# Patient Record
Sex: FEMALE | HISPANIC OR LATINO | Employment: PART TIME | URBAN - METROPOLITAN AREA
[De-identification: names, ages, dates, MRNs, and addresses within clinical notes are randomized per-mention and may not be internally consistent; named-entity substitution may affect disease eponyms.]

---

## 2018-01-26 ENCOUNTER — AMBULATORY - HEALTHEAST (OUTPATIENT)
Dept: MULTI SPECIALTY CLINIC | Facility: CLINIC | Age: 33
End: 2018-01-26

## 2018-01-26 LAB — PAP SMEAR - HIM PATIENT REPORTED: NORMAL

## 2018-08-07 ASSESSMENT — MIFFLIN-ST. JEOR: SCORE: 1489.67

## 2018-08-08 ENCOUNTER — ANESTHESIA - HEALTHEAST (OUTPATIENT)
Dept: OBGYN | Facility: CLINIC | Age: 33
End: 2018-08-08

## 2018-08-09 ENCOUNTER — SURGERY - HEALTHEAST (OUTPATIENT)
Dept: OBGYN | Facility: CLINIC | Age: 33
End: 2018-08-09

## 2018-08-09 ENCOUNTER — ANESTHESIA - HEALTHEAST (OUTPATIENT)
Dept: OBGYN | Facility: CLINIC | Age: 33
End: 2018-08-09

## 2018-08-14 ENCOUNTER — HOME CARE/HOSPICE - HEALTHEAST (OUTPATIENT)
Dept: HOME HEALTH SERVICES | Facility: HOME HEALTH | Age: 33
End: 2018-08-14

## 2018-08-15 ENCOUNTER — HOME CARE/HOSPICE - HEALTHEAST (OUTPATIENT)
Dept: HOME HEALTH SERVICES | Facility: HOME HEALTH | Age: 33
End: 2018-08-15

## 2019-06-10 ENCOUNTER — COMMUNICATION - HEALTHEAST (OUTPATIENT)
Dept: SCHEDULING | Facility: CLINIC | Age: 34
End: 2019-06-10

## 2019-06-28 ENCOUNTER — OFFICE VISIT - HEALTHEAST (OUTPATIENT)
Dept: FAMILY MEDICINE | Facility: CLINIC | Age: 34
End: 2019-06-28

## 2019-06-28 DIAGNOSIS — H60.391 INFECTIVE OTITIS EXTERNA, RIGHT: ICD-10-CM

## 2019-06-28 DIAGNOSIS — R09.81 NASAL CONGESTION: ICD-10-CM

## 2019-06-28 DIAGNOSIS — J06.9 VIRAL URI WITH COUGH: ICD-10-CM

## 2019-07-30 ENCOUNTER — OFFICE VISIT - HEALTHEAST (OUTPATIENT)
Dept: FAMILY MEDICINE | Facility: CLINIC | Age: 34
End: 2019-07-30

## 2019-07-30 DIAGNOSIS — M54.50 ACUTE RIGHT-SIDED LOW BACK PAIN WITHOUT SCIATICA: ICD-10-CM

## 2019-07-30 DIAGNOSIS — R00.0 SINUS TACHYCARDIA: ICD-10-CM

## 2019-07-30 DIAGNOSIS — F45.8 TEETH GRINDING: ICD-10-CM

## 2019-07-30 DIAGNOSIS — I10 BENIGN ESSENTIAL HYPERTENSION: ICD-10-CM

## 2019-07-30 DIAGNOSIS — Z76.89 ENCOUNTER TO ESTABLISH CARE: ICD-10-CM

## 2021-05-29 NOTE — TELEPHONE ENCOUNTER
Triage call : No PCP within HE- unable to route    Patient and friend Chaparrita on the line- offered  for patient/friend and she declined. Friend was interpreting for patient who is also on the line.      Having nipple pain- redness, pimple like dots and pain around areola that started 3 days ago. Patient is still pumping and getting 1-2 oz each time. Child is 10 months. No fever.     Triaged to be seen within the next 24 hours- advised her to call her PCP to be seen tomorrow.     Blessing Sheffield RN [facility] Care Connection Triage/Med Refill 6/10/2019 6:15 PM    Reason for Disposition    [1] Breast looks infected (e.g, spreading redness) AND [2] no fever    Protocols used: POSTPARTUM - BREASTFEEDING [id number]
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
## 2021-05-30 NOTE — PROGRESS NOTES
WALK IN CARE - VISIT NOTE    ASSESSMENT/PLAN  1. Infective otitis externa, right  Pain and purulence in right ear canal suggestive of infective process.  In her ear/TM do not look infected, so I do not think an oral antibiotic is warranted at this time.  - ofloxacin (FLOXIN) 0.3 % otic solution; 10 drops to right ear once daily for 7 days  Dispense: 10 mL; Refill: 0    2. Viral URI with cough  3. Nasal congestion  Likely a viral process, could be some allergic component as well.  Symptomatic care as discussed and handout provided.  Treatment as below.  - fluticasone (VERAMYST) 27.5 mcg/actuation nasal spray; 1 puff each nostril 2x per day  Dispense: 10 g; Refill: 12  - cetirizine (ZYRTEC) 10 MG tablet; Take 1 tablet (10 mg total) by mouth daily.  Dispense: 30 tablet; Refill: 2    Follow up in 1-2 weeks if symptoms persist.     Options for treatment and follow-up care were reviewed with the patient and/or guardian. Discussed symptoms in which to return to clinic sooner or go to the ER for evaluation. Milana Teague and/or guardian engaged in the decision making process and verbalized understanding of the options discussed and agreed with the final plan.    Jama Warren MD     \[facility]\""    Milana Teague is a 33 y.o. female brought in by her friend who is acting as the  presenting for evaluation of multiple symptoms:    Symptoms started yesterday - thinks first was her ear then other symptoms  Having a cough which makes her headache worse  Felt like she was warm yesterday  Having some pain in her right ear  Has some generalized muscle aches throughout  Having a lot of sneezing  Does not have Hx of allergies  No changes to her hearing  She did have one tablet of an antibiotic left over, maybe amoxicillin that she took one dose of. This was years old that she had left over  Did try to take an allergy pill but she does not know which      ROS  Complete ROS negative except as noted in HPI.    Social History      Tobacco Use     Smoking status: Never Smoker     Smokeless tobacco: Never Used   Substance Use Topics     Alcohol use: No     Drug use: No       Past Medical History:   Diagnosis Date     Anemia      Hepatitis A      Past Surgical History:   Procedure Laterality Date      SECTION N/A 2018    Procedure:  SECTION;  Surgeon: Demond Smith MD;  Location: [facility];  Service:       SECTION, LOW TRANSVERSE  2018    [facility]          OBJECTIVE  Vitals:    19 1813   BP: 121/78   Pulse: (!) 127   Resp: 18   Temp: 99  F (37.2  C)   SpO2: 99%       Physical Exam  General: No acute distress, sitting up in chair  Eyes: EOMI, PERRLA, normal conjunctiva, normal sclera   Ears: Right ear canal has purulent drainage and very tender with otoscope in canal, TM is not bulging or erythematous, left ear normal  Nose: moist mucosa, no rhinorrhea  Oral: moist oral mucosa, no tonsillar exudates, no erythema  Lymph: no lymphadenopathy  Neck: good ROM, supple  CV: Tachy but regular, distal and peripheral pulses in tact  Resp: CTA bilaterally, no wheezing, no rhonchi, no rhales, no respiratory distress  Abdomen: soft, non-tender, normal bowel sounds  Neuro: moves all 4 extremities, no focal deficits noted  Extrem: no edema, no cyanosis, well-perfused

## 2021-06-01 VITALS — HEIGHT: 65 IN | WEIGHT: 178 LBS | BODY MASS INDEX: 29.66 KG/M2

## 2021-06-03 VITALS — WEIGHT: 168 LBS | BODY MASS INDEX: 27.96 KG/M2

## 2021-06-03 VITALS — BODY MASS INDEX: 24.17 KG/M2 | WEIGHT: 145.25 LBS

## 2021-06-16 PROBLEM — O14.13 SEVERE PREECLAMPSIA, THIRD TRIMESTER: Status: ACTIVE | Noted: 2018-08-07

## 2021-06-16 PROBLEM — Z98.891 S/P CESAREAN SECTION: Status: ACTIVE | Noted: 2018-08-14

## 2021-06-17 NOTE — PATIENT INSTRUCTIONS - HE
Patient Instructions by Jama Warren MD at 6/28/2019  5:50 PM     Author: Jama Warren MD Service: -- Author Type: Resident    Filed: 6/28/2019  6:36 PM Encounter Date: 6/28/2019 Status: Signed    : Jama Warren MD (Resident)       Patient Education     External Ear Infection (Adult)    External otitis (also called swimmers ear) is an infection in the ear canal. It is often caused by bacteria or fungus. It can occur a few days after water gets trapped in the ear canal (from swimming or bathing). It can also occur after cleaning too deeply in the ear canal with a cotton swab or other object. Sometimes, hair care products get into the ear canal and cause this problem.  Symptoms can include pain, fever, itching, redness, drainage, or swelling of the ear canal. Temporary hearing loss may also occur.  Home care    Do not try to clean the ear canal. This can push pus and bacteria deeper into the canal.    Use prescribed ear drops as directed. These help reduce swelling and fight the infection. If an ear wick was placed in the ear canal, apply drops right onto the end of the wick. The wick will draw the medicine into the ear canal even if it is swollen closed.    A cotton ball may be loosely placed in the outer ear to absorb any drainage.    You may use acetaminophen or ibuprofen to control pain, unless another medicine was prescribed. Note: If you have chronic liver or kidney disease or ever had a stomach ulcer or GI bleeding, talk to your healthcare provider before taking any of these medicines.    Do not allow water to get into your ear when bathing. Also, don't swim until the infection has cleared.  Prevention    Keep your ears dry. This helps lower the risk of infection. Dry your ears with a towel or hair dryer after getting wet. Also, use ear plugs when swimming.    Do not stick any objects in the ear to remove wax.    If you feel water trapped in your ear, use ear drops right away. You can get  "these drops over the counter at most drugstores. They work by removing water from the ear canal.  Follow-up care  Follow up with your healthcare provider in 1 week, or as advised.  When to seek medical advice  Call your healthcare provider right away if any of these occur:    Ear pain becomes worse or doesnt improve after 3 days of treatment    Redness or swelling of the outer ear occurs or gets worse    Headache    Painful or stiff neck    Drowsiness or confusion    Fever of 100.4 F (38 C) or higher, or as directed by your healthcare provider    Seizure  Date Last Reviewed: 10/1/2017    3689-9716 [employer]. [address] [place], [place] [id number]. All rights reserved. This information is not intended as a substitute for professional medical care. Always follow your healthcare professional's instructions.         Patient Education     Viral Syndrome (Adult)  A viral illness may cause a number of symptoms. The symptoms depend on the part of the body that the virus affects. If it settles in your nose, throat, and lungs, it may cause cough, sore throat, congestion, and sometimes headache. If it settles in your stomach and intestinal tract, it may cause vomiting and diarrhea. Sometimes it causes vague symptoms like \"aching all over,\" feeling tired, loss of appetite, or fever.  A viral illness usually lasts 1 to 2 weeks, but sometimes it lasts longer. In some cases, a more serious infection can look like a viral syndrome in the first few days of the illness. You may need another exam and additional tests to know the difference. Watch for the warning signs listed below.  Home care  Follow these guidelines for taking care of yourself at home:    If symptoms are severe, rest at home for the first 2 to 3 days.    Stay away from cigarette smoke - both your smoke and the smoke from others.    You may use over-the-counter acetaminophen or ibuprofen for fever, muscle aching, and headache, unless another " medicine was prescribed for this. If you have chronic liver or kidney disease or ever had a stomach ulcer or GI bleeding, talk with your doctor before using these medicines. No one who is younger than 18 and ill with a fever should take aspirin. It may cause severe disease or death.    Your appetite may be poor, so a light diet is fine. Avoid dehydration by drinking 8 to 12 8-ounce glasses of fluids each day. This may include water; orange juice; lemonade; apple, grape, and cranberry juice; clear fruit drinks; electrolyte replacement and sports drinks; and decaffeinated teas and coffee. If you have been diagnosed with a kidney disease, ask your doctor how much and what types of fluids you should drink to prevent dehydration. If you have kidney disease, drinking too much fluid can cause it build up in the your body and be dangerous to your health.    Over-the-counter remedies won't shorten the length of the illness but may be helpful for cough, sore throat; and nasal and sinus congestion. Don't use decongestants if you have high blood pressure.  Follow-up care  Follow up with your healthcare provider if you do not improve over the next week.  Call 911  Call 911 if any of the following occur:    Convulsion    Feeling weak, dizzy, or like you are going to faint    Chest pain, shortness of breath, wheezing, or difficulty breathing  When to seek medical advice  Call your healthcare provider right away if any of these occur:    Cough with lots of colored sputum (mucus) or blood in your sputum    Chest pain, shortness of breath, wheezing, or difficulty breathing    Severe headache; face, neck, or ear pain    Severe, constant pain in the lower right side of your belly (abdominal)    Continued vomiting (cant keep liquids down)    Frequent diarrhea (more than 5 times a day); blood (red or black color) or mucus in diarrhea    Feeling weak, dizzy, or like you are going to faint    Extreme thirst    Fever of 100.4 F (38 C) or  higher, or as directed by your healthcare provider  Date Last Reviewed: 9/25/2015 2000-2017 [email or web address]. [address], [place] [address]. All rights reserved. This information is not intended as a substitute for professional medical care. Always follow your healthcare professional's instructions.

## 2021-06-19 NOTE — ANESTHESIA POSTPROCEDURE EVALUATION
Patient: Milana Teague   SECTION  Anesthesia type: epidural    Patient location: [facility] Surgical Floor  Last vitals:   Vitals:    18 1818   BP: 124/62   Pulse: 85   Resp: 20   Temp:    SpO2:      Post vital signs: stable  Level of consciousness: awake and responds to simple questions  Post-anesthesia pain: pain controlled  Post-anesthesia nausea and vomiting: no  Pulmonary: unassisted, return to baseline  Cardiovascular: stable and blood pressure at baseline  Hydration: adequate  Anesthetic events: no    QCDR Measures:  ASA# 11 - Bibiana-op Cardiac Arrest: ASA11B - Patient did NOT experience unanticipated cardiac arrest  ASA# 12 - Bibiana-op Mortality Rate: ASA12B - Patient did NOT die  ASA# 13 - PACU Re-Intubation Rate: NA - No ETT / LMA used for case  ASA# 10 - Composite Anes Safety: ASA10A - No serious adverse event    Additional Notes:

## 2021-06-19 NOTE — ANESTHESIA PROCEDURE NOTES
Epidural Block    Patient location during procedure: OB  Time Called: 8/8/2018 6:07 PM  Reason for Block:labor epidural  Staffing:  Performing  Anesthesiologist: DOUG ESPINOZA  Preanesthetic Checklist  Completed: patient identified, risks, benefits, and alternatives discussed, timeout performed, consent obtained, at patient's request, airway assessed, oxygen available, suction available, emergency drugs available and hand hygiene performed  Procedure  Patient position: sitting  Prep: ChloraPrep and site prepped and draped  Patient monitoring: continuous pulse oximetry, heart rate and blood pressure  Approach: midline  Location: L2-L3  Injection technique: VIRIDIANA saline  Number of Attempts:1  Needle  Needle type: Honk   Needle gauge: 17 G     Catheter in Space: 5  Assessment  Sensory level: T10  No complications      Additional Notes:  3cc 1% lidocaine skin, negative aspiration, negative test dose of 4cc 1.5% PF lidocaine with epi 1/200

## 2021-06-19 NOTE — ANESTHESIA PROCEDURE NOTES
Epidural Block    Patient location during procedure: OR    Reason for Block:primary anesthetic    Additional Notes:  Epidural already in place.  See note from epidural placement.  bolused for

## 2021-06-19 NOTE — ANESTHESIA PREPROCEDURE EVALUATION
Anesthesia Evaluation      No history of anesthetic complications     Airway   Mallampati: III  Neck ROM: full   Pulmonary - negative ROS    breath sounds clear to auscultation                         Cardiovascular - normal exam  (+) hypertension, ,     Rhythm: regular  Rate: normal,      ROS comment: Pre-eclampsia: on magnesium therapy     Neuro/Psych - negative ROS     Endo/Other    (+) pregnant     GI/Hepatic/Renal - negative ROS           Dental - normal exam                        Anesthesia Plan  Planned anesthetic: epidural  Epidural in place, working well  ASA 3 - emergent     Anesthetic plan and risks discussed with: patient (neighbor used as )    Post-op plan: routine recovery

## 2021-06-19 NOTE — ANESTHESIA CARE TRANSFER NOTE
Last vitals:   Vitals:    08/09/18 1654   BP: 107/50   Pulse: 81   Resp: 16   Temp: 37  C (98.6  F)   SpO2: 97%     Patient's level of consciousness is awake  Spontaneous respirations: yes  Maintains airway independently: yes  Dentition unchanged: yes  Oropharynx: oropharynx clear of all foreign objects    QCDR Measures:  ASA# 20 - Surgical Safety Checklist: WHO surgical safety checklist completed prior to induction  PQRS# 430 - Adult PONV Prevention: 4558F - Pt received => 2 anti-emetic agents (different classes) preop & intraop  ASA# 8 - Peds PONV Prevention: NA - Not pediatric patient, not GA or 2 or more risk factors NOT present  PQRS# 424 - Bibiana-op Temp Management: 4559F - At least one body temp DOCUMENTED => 35.5C or 95.9F within required timeframe  PQRS# 426 - PACU Transfer Protocol: - Transfer of care checklist used  ASA# 14 - Acute Post-op Pain: ASA14B - Patient did NOT experience pain >= 7 out of 10

## 2021-06-19 NOTE — ANESTHESIA PREPROCEDURE EVALUATION
Anesthesia Evaluation      Patient summary reviewed   No history of anesthetic complications     Airway   Mallampati: II  Neck ROM: full   Pulmonary - negative ROS    breath sounds clear to auscultation                         Cardiovascular   Exercise tolerance: > or = 4 METS  (+) hypertension, ,     (-) angina  Rhythm: regular  Rate: normal,      ROS comment: Severe pre-eclampsia, on magnesium gtt, platelets [id number], INR/ptt normal     Neuro/Psych    (+) anxiety/panic attacks,   (-) no seizures, no neuromuscular disease, no CVA    Endo/Other    (+) pregnant  (-) no diabetes     GI/Hepatic/Renal       Other findings: Pitting edema low back, scoliosis lumbar spine       Dental - normal exam                        Anesthesia Plan  Planned anesthetic: epidural    ASA 3     Anesthetic plan and risks discussed with: patient and  services used  Anesthesia plan special considerations: rapid sequence induction,   Post-op plan: routine recovery

## 2021-06-26 ENCOUNTER — HEALTH MAINTENANCE LETTER (OUTPATIENT)
Age: 36
End: 2021-06-26

## 2021-07-03 NOTE — ADDENDUM NOTE
Addendum Note by Hannah Gutierres MD at 8/16/2018 10:55 AM     Author: Hannah Gutierres MD Service: -- Author Type: Physician    Filed: 8/16/2018 10:55 AM Date of Service: 8/16/2018 10:55 AM Status: Signed    : Hannah Gutierres MD (Physician)       Addendum  created 08/16/18 1055 by Hannah Gutierres MD    Anesthesia Event deleted, Anesthesia Event edited, Anesthesia Intra Blocks edited, Child order released for a procedure order, LDA created via procedure documentation, Procedure Event Log accessed, Sign clinical note

## 2021-07-14 PROBLEM — Z34.90 PREGNANT: Status: RESOLVED | Noted: 2018-08-07 | Resolved: 2018-08-14

## 2021-07-14 PROBLEM — Z34.90 PREGNANCY: Status: RESOLVED | Noted: 2018-08-09 | Resolved: 2018-08-14

## 2021-10-16 ENCOUNTER — HEALTH MAINTENANCE LETTER (OUTPATIENT)
Age: 36
End: 2021-10-16

## 2022-07-23 ENCOUNTER — HEALTH MAINTENANCE LETTER (OUTPATIENT)
Age: 37
End: 2022-07-23

## 2022-10-01 ENCOUNTER — HEALTH MAINTENANCE LETTER (OUTPATIENT)
Age: 37
End: 2022-10-01

## 2022-10-11 ENCOUNTER — LAB REQUISITION (OUTPATIENT)
Dept: LAB | Facility: CLINIC | Age: 37
End: 2022-10-11

## 2022-10-11 DIAGNOSIS — Z12.4 ENCOUNTER FOR SCREENING FOR MALIGNANT NEOPLASM OF CERVIX: ICD-10-CM

## 2022-10-11 PROCEDURE — G0145 SCR C/V CYTO,THINLAYER,RESCR: HCPCS | Performed by: OBSTETRICS & GYNECOLOGY

## 2022-10-11 PROCEDURE — 87624 HPV HI-RISK TYP POOLED RSLT: CPT | Performed by: OBSTETRICS & GYNECOLOGY

## 2022-10-13 LAB
BKR LAB AP GYN ADEQUACY: NORMAL
BKR LAB AP GYN INTERPRETATION: NORMAL
BKR LAB AP HPV REFLEX: NORMAL
BKR LAB AP LMP: NORMAL
BKR LAB AP PREVIOUS ABNL DX: NORMAL
BKR LAB AP PREVIOUS ABNORMAL: NORMAL
PATH REPORT.COMMENTS IMP SPEC: NORMAL
PATH REPORT.COMMENTS IMP SPEC: NORMAL
PATH REPORT.RELEVANT HX SPEC: NORMAL

## 2022-10-17 LAB
HUMAN PAPILLOMA VIRUS 16 DNA: NEGATIVE
HUMAN PAPILLOMA VIRUS 18 DNA: NEGATIVE
HUMAN PAPILLOMA VIRUS FINAL DIAGNOSIS: NORMAL
HUMAN PAPILLOMA VIRUS OTHER HR: NEGATIVE

## 2023-08-06 ENCOUNTER — HEALTH MAINTENANCE LETTER (OUTPATIENT)
Age: 38
End: 2023-08-06

## 2023-12-24 ENCOUNTER — HEALTH MAINTENANCE LETTER (OUTPATIENT)
Age: 38
End: 2023-12-24

## 2024-06-14 ENCOUNTER — OFFICE VISIT (OUTPATIENT)
Dept: FAMILY MEDICINE | Facility: CLINIC | Age: 39
End: 2024-06-14
Payer: COMMERCIAL

## 2024-06-14 ENCOUNTER — TELEPHONE (OUTPATIENT)
Dept: FAMILY MEDICINE | Facility: CLINIC | Age: 39
End: 2024-06-14

## 2024-06-14 VITALS
RESPIRATION RATE: 16 BRPM | BODY MASS INDEX: 25.76 KG/M2 | OXYGEN SATURATION: 100 % | HEART RATE: 97 BPM | SYSTOLIC BLOOD PRESSURE: 130 MMHG | HEIGHT: 63 IN | WEIGHT: 145.4 LBS | TEMPERATURE: 98.3 F | DIASTOLIC BLOOD PRESSURE: 72 MMHG

## 2024-06-14 DIAGNOSIS — E83.41 HYPERMAGNESEMIA: ICD-10-CM

## 2024-06-14 DIAGNOSIS — J30.89 SEASONAL ALLERGIC RHINITIS DUE TO OTHER ALLERGIC TRIGGER: ICD-10-CM

## 2024-06-14 DIAGNOSIS — D17.30 LIPOMA OF SKIN AND SUBCUTANEOUS TISSUE: ICD-10-CM

## 2024-06-14 DIAGNOSIS — F45.8 BRUXISM: ICD-10-CM

## 2024-06-14 DIAGNOSIS — H11.002 PTERYGIUM EYE, LEFT: ICD-10-CM

## 2024-06-14 DIAGNOSIS — N93.9 ABNORMAL UTERINE BLEEDING: ICD-10-CM

## 2024-06-14 DIAGNOSIS — Z01.818 PREOP GENERAL PHYSICAL EXAM: Primary | ICD-10-CM

## 2024-06-14 PROBLEM — O14.90 PRE-ECLAMPSIA: Status: ACTIVE | Noted: 2018-08-07

## 2024-06-14 LAB
ANION GAP SERPL CALCULATED.3IONS-SCNC: 11 MMOL/L (ref 7–15)
BASOPHILS # BLD AUTO: 0 10E3/UL (ref 0–0.2)
BASOPHILS NFR BLD AUTO: 0 %
BUN SERPL-MCNC: 7.7 MG/DL (ref 6–20)
CALCIUM SERPL-MCNC: 9.5 MG/DL (ref 8.6–10)
CHLORIDE SERPL-SCNC: 104 MMOL/L (ref 98–107)
CREAT SERPL-MCNC: 0.7 MG/DL (ref 0.51–0.95)
DEPRECATED HCO3 PLAS-SCNC: 24 MMOL/L (ref 22–29)
EGFRCR SERPLBLD CKD-EPI 2021: >90 ML/MIN/1.73M2
EOSINOPHIL # BLD AUTO: 0 10E3/UL (ref 0–0.7)
EOSINOPHIL NFR BLD AUTO: 1 %
ERYTHROCYTE [DISTWIDTH] IN BLOOD BY AUTOMATED COUNT: 19.2 % (ref 10–15)
GLUCOSE SERPL-MCNC: 122 MG/DL (ref 70–99)
HCT VFR BLD AUTO: 44.2 % (ref 35–47)
HGB BLD-MCNC: 14.2 G/DL (ref 11.7–15.7)
IMM GRANULOCYTES # BLD: 0 10E3/UL
IMM GRANULOCYTES NFR BLD: 0 %
LYMPHOCYTES # BLD AUTO: 1.4 10E3/UL (ref 0.8–5.3)
LYMPHOCYTES NFR BLD AUTO: 21 %
MAGNESIUM SERPL-MCNC: 2.1 MG/DL (ref 1.7–2.3)
MCH RBC QN AUTO: 25.9 PG (ref 26.5–33)
MCHC RBC AUTO-ENTMCNC: 32.1 G/DL (ref 31.5–36.5)
MCV RBC AUTO: 81 FL (ref 78–100)
MONOCYTES # BLD AUTO: 0.5 10E3/UL (ref 0–1.3)
MONOCYTES NFR BLD AUTO: 8 %
NEUTROPHILS # BLD AUTO: 4.8 10E3/UL (ref 1.6–8.3)
NEUTROPHILS NFR BLD AUTO: 70 %
NRBC # BLD AUTO: 0 10E3/UL
NRBC BLD AUTO-RTO: 0 /100
PLATELET # BLD AUTO: 196 10E3/UL (ref 150–450)
POTASSIUM SERPL-SCNC: 4 MMOL/L (ref 3.4–5.3)
RBC # BLD AUTO: 5.49 10E6/UL (ref 3.8–5.2)
SODIUM SERPL-SCNC: 139 MMOL/L (ref 135–145)
WBC # BLD AUTO: 6.9 10E3/UL (ref 4–11)

## 2024-06-14 PROCEDURE — T1013 SIGN LANG/ORAL INTERPRETER: HCPCS | Mod: U4

## 2024-06-14 PROCEDURE — 85025 COMPLETE CBC W/AUTO DIFF WBC: CPT | Performed by: STUDENT IN AN ORGANIZED HEALTH CARE EDUCATION/TRAINING PROGRAM

## 2024-06-14 PROCEDURE — 99203 OFFICE O/P NEW LOW 30 MIN: CPT | Performed by: STUDENT IN AN ORGANIZED HEALTH CARE EDUCATION/TRAINING PROGRAM

## 2024-06-14 PROCEDURE — 80048 BASIC METABOLIC PNL TOTAL CA: CPT | Performed by: STUDENT IN AN ORGANIZED HEALTH CARE EDUCATION/TRAINING PROGRAM

## 2024-06-14 PROCEDURE — 83735 ASSAY OF MAGNESIUM: CPT | Performed by: STUDENT IN AN ORGANIZED HEALTH CARE EDUCATION/TRAINING PROGRAM

## 2024-06-14 PROCEDURE — 36415 COLL VENOUS BLD VENIPUNCTURE: CPT | Performed by: STUDENT IN AN ORGANIZED HEALTH CARE EDUCATION/TRAINING PROGRAM

## 2024-06-14 RX ORDER — FLUTICASONE PROPIONATE 50 MCG
1 SPRAY, SUSPENSION (ML) NASAL DAILY
Qty: 9.9 ML | Refills: 0 | Status: SHIPPED | OUTPATIENT
Start: 2024-06-14 | End: 2024-07-11

## 2024-06-14 RX ORDER — MULTIVITAMIN WITH IRON
1 TABLET ORAL DAILY
COMMUNITY

## 2024-06-14 RX ORDER — FERROUS SULFATE 325(65) MG
325 TABLET ORAL
COMMUNITY

## 2024-06-14 RX ORDER — CETIRIZINE HYDROCHLORIDE 5 MG/1
10 TABLET ORAL DAILY
COMMUNITY

## 2024-06-14 NOTE — PROGRESS NOTES
Preoperative Evaluation  [facility]  [address] [address] [address]  Phone: [phone]  Fax: [phone]  Primary Provider: Demond Smith MD, MD  Pre-op Performing Provider: Earl Gold MD  Jun 14, 2024 6/14/2024   Surgical Information   What procedure is being done? remove a fat ball on my right leg   Facility or Hospital where procedure/surgery will be performed: [facility]   Who is doing the procedure / surgery? DR Bonds   Date of surgery / procedure: june 20   Time of surgery / procedure: 8am   Where do you plan to recover after surgery? at home with family     Fax number for surgical facility: [phone]    Assessment & Plan     The proposed surgical procedure is considered LOW risk.    Preop general physical exam  Ms. Teague is a 38-year-old female who presents today for preoperative physical exam, no acute concerns today other than nasal congestion.  On examination, does have mass consistent with lipoma as described by patient on right medial upper thigh, as well as swollen and erythematous turbinates, discussed further below, otherwise no significant findings.    Lipoma of skin and subcutaneous tissue  Has 3 x 5 cm ovular soft mass in right upper thigh on medial side, consistent with lipoma on exam.  She is having this removed surgically, for which she is being seen by me for preop evaluation.    Seasonal allergic rhinitis due to other allergic trigger    Has allergic rhinitis, usually uses Zyrtec and Flonase.  Likely that the sinus congestion she is experiencing is secondary to allergies, no other symptoms other than sinus congestion and clear rhinorrhea.  Will attempt treatment with Flonase, she may continue Zyrtec at home.    - fluticasone (FLONASE) 50 MCG/ACT nasal spray  Dispense: 9.9 mL; Refill: 0    Abnormal uterine bleeding  Reports that she has significant bleeding with her periods which leads to clotting, and  she has an appointment with gynecology to further evaluate this, she prefers to wait for that visit for the evaluation.  However today we will at least get a blood count to evaluate for anemia prior to her procedure.  She is taking iron supplement daily.    - CBC with platelets and differential  - CBC with platelets and differential    Hypermagnesemia  Had hypermagnesemia in 2018, at the same time that she had preeclampsia, this is most probably iatrogenic however recommend repeat magnesium level as well as BMP today as we do not have any lab work following 2018 for this.  - Magnesium  - Basic metabolic panel  - Magnesium  - Basic metabolic panel    Pterygium eye, left  Has pterygium of the left eye, just starting to encroach on the iris, recommended that she avoid sun exposure to the eye with sunglasses and hats.  She reports she will do this, it is currently asymptomatic.    Bruxism    Possible Sleep Apnea:        6/14/2024     9:53 AM   STOP-Bang Total Score   Total Score 0   Risk Stratification 0 - 2: Low Risk for SABA           - No identified additional risk factors other than previously addressed    Antiplatelet or Anticoagulation Medication Instructions   - Patient is on no antiplatelet or anticoagulation medications.    Additional Medication Instructions   - ibuprofen (Advil, Motrin): DO NOT TAKE 1 day before surgery.     Recommendation  Approval given to proceed with proposed procedure, without further diagnostic evaluation.        Dimas Cortés is a 38 year old, presenting for the following:  Pre-Op Exam          6/14/2024     8:43 AM   Additional Questions   Roomed by LESLIE MERCHANT related to upcoming procedure:         6/14/2024   Pre-Op Questionnaire   Have you ever had a heart attack or stroke? No   Have you ever had surgery on your heart or blood vessels, such as a stent placement, a coronary artery bypass, or surgery on an artery in your head, neck, heart, or legs? No   Do you have chest pain  with activity? No   Do you have a history of heart failure? No   Do you currently have a cold, bronchitis or symptoms of other infection? (!) YES has congestion that started two days. Her spouse also had similar symptoms, but he suffers from allergies. She also suffers from allergies.    Do you have a cough, shortness of breath, or wheezing? No   Do you or anyone in your family have previous history of blood clots? No   Do you or does anyone in your family have a serious bleeding problem such as prolonged bleeding following surgeries or cuts? No   Have you ever had problems with anemia or been told to take iron pills? No   Have you had any abnormal blood loss such as black, tarry or bloody stools, or abnormal vaginal bleeding? No   Have you ever had a blood transfusion? No   Are you willing to have a blood transfusion if it is medically needed before, during, or after your surgery? Yes   Have you or any of your relatives ever had problems with anesthesia? No   Do you have sleep apnea, excessive snoring or daytime drowsiness? (!) UNKNOWN, she has bruxism at night.    Do you have any artifical heart valves or other implanted medical devices like a pacemaker, defibrillator, or continuous glucose monitor? No   Do you have artificial joints? No   Are you allergic to latex? No     Health Care Directive  Patient does not have a Health Care Directive or Living Will: Discussed advance care planning with patient; information given to patient to review.    Preoperative Review of    reviewed - no record of controlled substances prescribed.          Patient Active Problem List    Diagnosis Date Noted    Bruxism 2024     Priority: Medium    Abnormal uterine bleeding      Priority: Medium    Pterygium eye, left      Priority: Medium    Seasonal allergic rhinitis      Priority: Medium    S/P  section 2018     Priority: Medium    Pre-eclampsia 2018     Priority: Medium      No past medical history on  "file.  Past Surgical History:   Procedure Laterality Date    C/SECTION, LOW TRANSVERSE  2018    [facility] / [facility]      SECTION N/A 2018    Procedure:  SECTION;  Surgeon: Demond Smith MD;  Location: [facility] L[facility] OR;  Service:      Current Outpatient Medications   Medication Sig Dispense Refill    acetaminophen (TYLENOL) 100 mg/mL suspension [ACETAMINOPHEN (TYLENOL) 100 MG/ML SUSPENSION] Take 5-10 mL (500-1,000 mg total) by mouth every 4 (four) hours as needed for fever. 300 mL 0    ferrous sulfate (FEROSUL) 325 (65 Fe) MG tablet Take 325 mg by mouth daily (with breakfast)      ibuprofen (ADVIL,MOTRIN) 600 mg/30 mL suspension [IBUPROFEN (ADVIL,MOTRIN) 600 MG/30 ML SUSPENSION] Take 30 mL (600 mg total) by mouth every 6 (six) hours as needed for mild pain (1-3). 900 mL 0    fluticasone (VERAMYST) 27.5 mcg/actuation nasal spray [FLUTICASONE (VERAMYST) 27.5 MCG/ACTUATION NASAL SPRAY] 1 puff each nostril 2x per day (Patient not taking: Reported on 2024) 10 g 12       No Known Allergies     Social History     Tobacco Use    Smoking status: Never     Passive exposure: Never    Smokeless tobacco: Never   Substance Use Topics    Alcohol use: No       History   Drug Use No             Review of Systems  Constitutional, neuro, ENT, endocrine, pulmonary, cardiac, gastrointestinal, genitourinary, musculoskeletal, integument and psychiatric systems are negative, except as otherwise noted.    Objective    /72 (BP Location: Right arm, Patient Position: Sitting, Cuff Size: Adult Regular)   Pulse 97   Temp 98.3  F (36.8  C) (Oral)   Resp 16   Ht 1.608 m (5' 3.31\")   Wt 66 kg (145 lb 6.4 oz)   LMP 2024 (Exact Date)   SpO2 100%   BMI 25.51 kg/m     Estimated body mass index is 25.51 kg/m  as calculated from the following:    Height as of this encounter: 1.608 m (5' 3.31\").    Weight as of this encounter: 66 kg (145 lb 6.4 oz).  Physical Exam  GENERAL: " "alert and no distress  EYES: pterygium of left eye, medial, just encroaching on iris. Eyes otherwise grossly normal to inspection, PERRL and conjunctivae and sclerae otherwise normal  HENT: ear canals and TM's normal, turbinates edematous and erythematous, nose and mouth without ulcers or lesions  NECK: no adenopathy, no asymmetry, masses, or scars  RESP: lungs clear to auscultation - no rales, rhonchi or wheezes  CV: regular rate and rhythm, normal S1 S2, no S3 or S4, no murmur, click or rub, no peripheral edema  ABDOMEN: soft, nontender, no hepatosplenomegaly, no masses and bowel sounds normal  MS: no gross musculoskeletal defects noted, no edema  SKIN: Right upper medial thigh has soft and ovular mass that is approximately 5 x 3 cm in size no other suspicious lesions or rashes.   NEURO: Normal strength and tone, mentation intact and speech normal  PSYCH: mentation appears normal, affect normal/bright    No results for input(s): \"HGB\", \"PLT\", \"INR\", \"NA\", \"POTASSIUM\", \"CR\", \"A1C\" in the last 8760 hours.     Diagnostics  No results found for this or any previous visit (from the past 720 hour(s)).   No EKG required, no history of coronary heart disease, significant arrhythmia, peripheral arterial disease or other structural heart disease.    Revised Cardiac Risk Index (RCRI)  The patient has the following serious cardiovascular risks for perioperative complications:   - No serious cardiac risks = 0 points     RCRI Interpretation: 0 points: Class I (very low risk - 0.4% complication rate)         Signed Electronically by: Earl Gold MD  Copy of this evaluation report is provided to requesting physician.         "

## 2024-06-14 NOTE — TELEPHONE ENCOUNTER
FYI - Status Update    Who is Calling: patient    Update: pt calling with Fax number for [facility] w/Dr. Guzman  Fax #: [phone]    Please fax notes and lab results(when done processing)    Does caller want a call/response back: No   Doxycycline Pregnancy And Lactation Text: This medication is Pregnancy Category D and not consider safe during pregnancy. It is also excreted in breast milk but is considered safe for shorter treatment courses.

## 2024-06-14 NOTE — PATIENT INSTRUCTIONS

## 2024-06-27 ENCOUNTER — HOSPITAL ENCOUNTER (OUTPATIENT)
Facility: CLINIC | Age: 39
Discharge: HOME | End: 2024-06-27
Admitting: OBSTETRICS & GYNECOLOGY
Payer: COMMERCIAL

## 2024-06-27 ENCOUNTER — LAB REQUISITION (OUTPATIENT)
Dept: LAB | Facility: CLINIC | Age: 39
End: 2024-06-27

## 2024-06-27 DIAGNOSIS — Z13.1 ENCOUNTER FOR SCREENING FOR DIABETES MELLITUS: ICD-10-CM

## 2024-06-27 DIAGNOSIS — Z31.41 ENCOUNTER FOR FERTILITY TESTING: ICD-10-CM

## 2024-06-27 DIAGNOSIS — Z13.220 ENCOUNTER FOR SCREENING FOR LIPOID DISORDERS: ICD-10-CM

## 2024-06-27 DIAGNOSIS — N92.0 EXCESSIVE AND FREQUENT MENSTRUATION WITH REGULAR CYCLE: ICD-10-CM

## 2024-06-27 LAB
CHOLEST SERPL-MCNC: 182 MG/DL
FASTING STATUS PATIENT QL REPORTED: ABNORMAL
FSH SERPL IRP2-ACNC: 5.8 MIU/ML
HBA1C MFR BLD: 5.7 %
HDLC SERPL-MCNC: 45 MG/DL
LDLC SERPL CALC-MCNC: 107 MG/DL
MIS SERPL-MCNC: 1.25 NG/ML (ref 0.15–7.5)
NONHDLC SERPL-MCNC: 137 MG/DL
TRIGL SERPL-MCNC: 150 MG/DL
TSH SERPL DL<=0.005 MIU/L-ACNC: 1.36 UIU/ML (ref 0.3–4.2)

## 2024-06-27 PROCEDURE — 83036 HEMOGLOBIN GLYCOSYLATED A1C: CPT | Performed by: OBSTETRICS & GYNECOLOGY

## 2024-06-27 PROCEDURE — 80061 LIPID PANEL: CPT | Performed by: OBSTETRICS & GYNECOLOGY

## 2024-06-27 PROCEDURE — 83001 ASSAY OF GONADOTROPIN (FSH): CPT | Performed by: OBSTETRICS & GYNECOLOGY

## 2024-06-27 PROCEDURE — 84443 ASSAY THYROID STIM HORMONE: CPT | Performed by: OBSTETRICS & GYNECOLOGY

## 2024-06-27 PROCEDURE — 82166 ASSAY ANTI-MULLERIAN HORM: CPT | Performed by: OBSTETRICS & GYNECOLOGY

## 2024-07-11 DIAGNOSIS — J30.89 SEASONAL ALLERGIC RHINITIS DUE TO OTHER ALLERGIC TRIGGER: ICD-10-CM

## 2024-07-11 RX ORDER — FLUTICASONE PROPIONATE 50 MCG
1 SPRAY, SUSPENSION (ML) NASAL DAILY
Qty: 9.9 ML | Refills: 0 | Status: SHIPPED | OUTPATIENT
Start: 2024-07-11

## 2025-07-20 ENCOUNTER — HEALTH MAINTENANCE LETTER (OUTPATIENT)
Age: 40
End: 2025-07-20

## 2025-07-21 ENCOUNTER — LAB REQUISITION (OUTPATIENT)
Dept: LAB | Facility: CLINIC | Age: 40
End: 2025-07-21

## 2025-07-21 DIAGNOSIS — Z12.4 ENCOUNTER FOR SCREENING FOR MALIGNANT NEOPLASM OF CERVIX: ICD-10-CM

## 2025-07-21 PROCEDURE — G0145 SCR C/V CYTO,THINLAYER,RESCR: HCPCS | Performed by: OBSTETRICS & GYNECOLOGY

## 2025-07-21 PROCEDURE — 87624 HPV HI-RISK TYP POOLED RSLT: CPT | Performed by: OBSTETRICS & GYNECOLOGY

## 2025-07-22 LAB
HPV HR 12 DNA CVX QL NAA+PROBE: NEGATIVE
HPV16 DNA CVX QL NAA+PROBE: NEGATIVE
HPV18 DNA CVX QL NAA+PROBE: NEGATIVE
HUMAN PAPILLOMA VIRUS FINAL DIAGNOSIS: NORMAL

## 2025-07-23 ENCOUNTER — PATIENT OUTREACH (OUTPATIENT)
Dept: CARE COORDINATION | Facility: CLINIC | Age: 40
End: 2025-07-23
Payer: COMMERCIAL

## 2025-07-24 LAB
BKR AP ASSOCIATED HPV REPORT: NORMAL
BKR LAB AP GYN ADEQUACY: NORMAL
BKR LAB AP GYN INTERPRETATION: NORMAL
BKR LAB AP LMP: NORMAL
BKR LAB AP PREVIOUS ABNL DX: NORMAL
BKR LAB AP PREVIOUS ABNORMAL: NORMAL
PATH REPORT.COMMENTS IMP SPEC: NORMAL
PATH REPORT.COMMENTS IMP SPEC: NORMAL
PATH REPORT.RELEVANT HX SPEC: NORMAL

## 2025-08-10 ENCOUNTER — HEALTH MAINTENANCE LETTER (OUTPATIENT)
Age: 40
End: 2025-08-10

## 2025-08-15 ENCOUNTER — ANCILLARY PROCEDURE (OUTPATIENT)
Dept: MAMMOGRAPHY | Facility: CLINIC | Age: 40
End: 2025-08-15
Attending: OBSTETRICS & GYNECOLOGY
Payer: COMMERCIAL

## 2025-08-15 DIAGNOSIS — Z12.31 VISIT FOR SCREENING MAMMOGRAM: ICD-10-CM

## 2025-08-15 PROCEDURE — 77063 BREAST TOMOSYNTHESIS BI: CPT | Mod: TC | Performed by: RADIOLOGY

## 2025-08-15 PROCEDURE — 77067 SCR MAMMO BI INCL CAD: CPT | Mod: TC | Performed by: RADIOLOGY
